# Patient Record
Sex: MALE | Race: AMERICAN INDIAN OR ALASKA NATIVE
[De-identification: names, ages, dates, MRNs, and addresses within clinical notes are randomized per-mention and may not be internally consistent; named-entity substitution may affect disease eponyms.]

---

## 2018-01-18 ENCOUNTER — HOSPITAL ENCOUNTER (EMERGENCY)
Dept: HOSPITAL 5 - ED | Age: 18
LOS: 1 days | Discharge: LEFT BEFORE DISCHARGE | End: 2018-01-19
Payer: SELF-PAY

## 2018-01-18 DIAGNOSIS — Z53.21: Primary | ICD-10-CM

## 2018-01-18 PROCEDURE — 87400 INFLUENZA A/B EACH AG IA: CPT

## 2018-01-18 PROCEDURE — 71046 X-RAY EXAM CHEST 2 VIEWS: CPT

## 2018-01-19 VITALS — DIASTOLIC BLOOD PRESSURE: 82 MMHG | SYSTOLIC BLOOD PRESSURE: 115 MMHG

## 2018-01-19 NOTE — XRAY REPORT
FINAL REPORT



EXAM:  XR CHEST ROUTINE 2V



HISTORY:  Upper Respiratory Infection 



TECHNIQUE:  PA and lateral views of the chest were submitted.



FINDINGS:  

The heart size and mediastinum appear normal. The lungs are

clear. Pleural fluid is not seen. The bones soft tissues are well

maintained



IMPRESSION:  

Within normal limits.